# Patient Record
Sex: MALE | Race: WHITE | Employment: OTHER | URBAN - METROPOLITAN AREA
[De-identification: names, ages, dates, MRNs, and addresses within clinical notes are randomized per-mention and may not be internally consistent; named-entity substitution may affect disease eponyms.]

---

## 2018-11-26 ENCOUNTER — HOSPITAL ENCOUNTER (OUTPATIENT)
Dept: PREADMISSION TESTING | Age: 78
Discharge: HOME | End: 2018-11-26
Attending: PLASTIC SURGERY
Payer: MEDICARE

## 2018-11-26 ENCOUNTER — HOSPITAL ENCOUNTER (EMERGENCY)
Age: 78
Discharge: STILL ADMITTED OR ADMINISTRATIVE | End: 2018-11-26
Attending: EMERGENCY MEDICINE
Payer: MEDICARE

## 2018-11-26 VITALS
RESPIRATION RATE: 18 BRPM | TEMPERATURE: 97.6 F | HEART RATE: 75 BPM | OXYGEN SATURATION: 95 % | WEIGHT: 227.07 LBS | DIASTOLIC BLOOD PRESSURE: 67 MMHG | HEIGHT: 72 IN | BODY MASS INDEX: 30.76 KG/M2 | SYSTOLIC BLOOD PRESSURE: 143 MMHG

## 2018-11-26 LAB
ANION GAP SERPL CALC-SCNC: 6 MMOL/L (ref 5–15)
ATRIAL RATE: 73 BPM
BUN SERPL-MCNC: 17 MG/DL (ref 6–20)
BUN/CREAT SERPL: 16 (ref 12–20)
CALCIUM SERPL-MCNC: 8.4 MG/DL (ref 8.5–10.1)
CALCULATED R AXIS, ECG10: 19 DEGREES
CALCULATED T AXIS, ECG11: 38 DEGREES
CHLORIDE SERPL-SCNC: 109 MMOL/L (ref 97–108)
CO2 SERPL-SCNC: 27 MMOL/L (ref 21–32)
CREAT SERPL-MCNC: 1.09 MG/DL (ref 0.7–1.3)
DIAGNOSIS, 93000: NORMAL
ERYTHROCYTE [DISTWIDTH] IN BLOOD BY AUTOMATED COUNT: 13.7 % (ref 11.5–14.5)
GLUCOSE SERPL-MCNC: 99 MG/DL (ref 65–100)
HCT VFR BLD AUTO: 40.2 % (ref 36.6–50.3)
HGB BLD-MCNC: 13.3 G/DL (ref 12.1–17)
MCH RBC QN AUTO: 32 PG (ref 26–34)
MCHC RBC AUTO-ENTMCNC: 33.1 G/DL (ref 30–36.5)
MCV RBC AUTO: 96.9 FL (ref 80–99)
NRBC # BLD: 0 K/UL (ref 0–0.01)
NRBC BLD-RTO: 0 PER 100 WBC
P-R INTERVAL, ECG05: 198 MS
PLATELET # BLD AUTO: 108 K/UL (ref 150–400)
PMV BLD AUTO: 11.6 FL (ref 8.9–12.9)
POTASSIUM SERPL-SCNC: 4.2 MMOL/L (ref 3.5–5.1)
Q-T INTERVAL, ECG07: 392 MS
QRS DURATION, ECG06: 92 MS
QTC CALCULATION (BEZET), ECG08: 431 MS
RBC # BLD AUTO: 4.15 M/UL (ref 4.1–5.7)
SODIUM SERPL-SCNC: 142 MMOL/L (ref 136–145)
VENTRICULAR RATE, ECG03: 73 BPM
WBC # BLD AUTO: 3.9 K/UL (ref 4.1–11.1)

## 2018-11-26 PROCEDURE — 75810000275 HC EMERGENCY DEPT VISIT NO LEVEL OF CARE

## 2018-11-26 PROCEDURE — 36415 COLL VENOUS BLD VENIPUNCTURE: CPT

## 2018-11-26 PROCEDURE — 80048 BASIC METABOLIC PNL TOTAL CA: CPT

## 2018-11-26 PROCEDURE — 93005 ELECTROCARDIOGRAM TRACING: CPT

## 2018-11-26 PROCEDURE — 85027 COMPLETE CBC AUTOMATED: CPT

## 2018-11-26 RX ORDER — SODIUM CHLORIDE, SODIUM LACTATE, POTASSIUM CHLORIDE, CALCIUM CHLORIDE 600; 310; 30; 20 MG/100ML; MG/100ML; MG/100ML; MG/100ML
25 INJECTION, SOLUTION INTRAVENOUS CONTINUOUS
Status: CANCELLED | OUTPATIENT
Start: 2018-11-30

## 2018-11-26 NOTE — PERIOP NOTES
[facility] Preoperative Instructions Surgery Date 11/30/2018          Time of Arrival 5:45 a.m. 
 
1. On the day of your surgery, please report to the Surgical Services Registration Desk and sign in at your designated time. The Surgery Center is located to the right of the Emergency Room. 2. You must have someone with you to drive you home. You should not drive a car for 24 hours following surgery. Please make arrangements for a friend or family member to stay with you for the first 24 hours after your surgery. 3. Do not have anything to eat or drink (including water, gum, mints, coffee, juice) after midnight. ?This may not apply to medications prescribed by your physician. ?(Please note below the special instructions with medications to take the morning of your procedure.) 4. We recommend you do not drink any alcoholic beverages for 24 hours before and after your surgery. 5. Contact your surgeons office for instructions on the following medications: non-steroidal anti-inflammatory drugs (i.e. Advil, Aleve), vitamins, and supplements. (Some surgeons will want you to stop these medications prior to surgery and others may allow you to take them) **If you are currently taking Plavix, Coumadin, Aspirin and/or other blood-thinning agents, contact your surgeon for instructions. ** Your surgeon will partner with the physician prescribing these medications to determine if it is safe to stop or if you need to continue taking. Please do not stop taking these medications without instructions from your surgeon 6. Wear comfortable clothes. Wear glasses instead of contacts. Do not bring any money or jewelry. Please bring picture ID, insurance card, and any prearranged co-payment or hospital payment. Do not wear make-up, particularly mascara the morning of your surgery. Do not wear nail polish, particularly if you are having foot /hand surgery.   Wear your hair loose or down, no ponytails, buns, yariel pins or clips. All body piercings must be removed. Please shower with antibacterial soap for three consecutive days before and on the morning of surgery, but do not apply any lotions, powders or deodorants after the shower on the day of surgery. Please use a fresh towels after each shower. Please sleep in clean clothes and change bed linens the night before surgery. Please do not shave for 48 hours prior to surgery. Shaving of the face is acceptable. 7. You should understand that if you do not follow these instructions your surgery may be cancelled. If your physical condition changes (I.e. fever, cold or flu) please contact your surgeon as soon as possible. 8. It is important that you be on time. If a situation occurs where you may be late, please call [phone] (OR Holding Area). 9. If you have any questions and or problems, please call [phone] (Pre-admission Testing). 10. Your surgery time may be subject to change. You will receive a phone call the evening prior if your time changes. 11.  If having outpatient surgery, you must have someone to drive you here, stay with you during the duration of your stay, and to drive you home at time of discharge. 12.   In an effort to improve the efficiency, privacy, and safety for all of our Pre-op patients visitors are not allowed in the Holding area. Once you arrive and are registered your family/visitors will be asked to remain in the waiting room. The Pre-op staff will get you from the Surgical Waiting Area and will explain to you and your family/visitors that the Pre-op phase is beginning. The staff will answer any questions and provide instructions for tracking of the patient, by use of the existing tracking number and color-coded status board in the waiting room.   At this time the staff will also ask for your designated spokesperson information in the event that the physician or staff need to provide an update or obtain any pertinent information. The designated spokesperson will be notified if the physician needs to speak to family during the pre-operative phase. If at any time your family/visitors has questions or concerns they may approach the volunteer desk in the waiting area for assistance. Special Instructions: MEDICATIONS TO TAKE THE MORNING OF SURGERY WITH A SIP OF WATER: None I understand a pre-operative phone call will be made to verify my surgery time. In the event that I am not available, I give permission for a message to be left on my answering service and/or with another person? Yes [phone] 
 
 
 
 ___________________      __________   _________ 
  (Signature of Patient)             (Witness)                (Date and Time)

## 2018-11-26 NOTE — PERIOP NOTES
Preop CBC and BMP faxed to Dr. Naldo Quiles and Dr. Socorro Hale, noting CBC results. Both faxes confirmed.

## 2018-11-30 ENCOUNTER — ANESTHESIA EVENT (OUTPATIENT)
Dept: SURGERY | Age: 78
End: 2018-11-30
Payer: MEDICARE

## 2018-11-30 ENCOUNTER — HOSPITAL ENCOUNTER (OUTPATIENT)
Age: 78
Setting detail: OUTPATIENT SURGERY
Discharge: HOME | End: 2018-11-30
Attending: PLASTIC SURGERY | Admitting: PLASTIC SURGERY
Payer: MEDICARE

## 2018-11-30 ENCOUNTER — ANESTHESIA (OUTPATIENT)
Dept: SURGERY | Age: 78
End: 2018-11-30
Payer: MEDICARE

## 2018-11-30 VITALS
HEART RATE: 60 BPM | WEIGHT: 218.92 LBS | BODY MASS INDEX: 29.65 KG/M2 | HEIGHT: 72 IN | OXYGEN SATURATION: 96 % | RESPIRATION RATE: 16 BRPM | TEMPERATURE: 97.6 F | SYSTOLIC BLOOD PRESSURE: 144 MMHG | DIASTOLIC BLOOD PRESSURE: 64 MMHG

## 2018-11-30 LAB
ERYTHROCYTE [DISTWIDTH] IN BLOOD BY AUTOMATED COUNT: 13.4 % (ref 11.5–14.5)
HCT VFR BLD AUTO: 42.4 % (ref 36.6–50.3)
HGB BLD-MCNC: 14.4 G/DL (ref 12.1–17)
MCH RBC QN AUTO: 31.9 PG (ref 26–34)
MCHC RBC AUTO-ENTMCNC: 34 G/DL (ref 30–36.5)
MCV RBC AUTO: 94 FL (ref 80–99)
NRBC # BLD: 0 K/UL (ref 0–0.01)
NRBC BLD-RTO: 0 PER 100 WBC
PLATELET # BLD AUTO: 117 K/UL (ref 150–400)
PMV BLD AUTO: 12.1 FL (ref 8.9–12.9)
RBC # BLD AUTO: 4.51 M/UL (ref 4.1–5.7)
WBC # BLD AUTO: 4.7 K/UL (ref 4.1–11.1)

## 2018-11-30 PROCEDURE — 74011000250 HC RX REV CODE- 250: Performed by: PLASTIC SURGERY

## 2018-11-30 PROCEDURE — 36415 COLL VENOUS BLD VENIPUNCTURE: CPT

## 2018-11-30 PROCEDURE — 76210000006 HC OR PH I REC 0.5 TO 1 HR: Performed by: PLASTIC SURGERY

## 2018-11-30 PROCEDURE — 77030011640 HC PAD GRND REM COVD -A: Performed by: PLASTIC SURGERY

## 2018-11-30 PROCEDURE — 74011250636 HC RX REV CODE- 250/636

## 2018-11-30 PROCEDURE — 76010000149 HC OR TIME 1 TO 1.5 HR: Performed by: PLASTIC SURGERY

## 2018-11-30 PROCEDURE — 76060000033 HC ANESTHESIA 1 TO 1.5 HR: Performed by: PLASTIC SURGERY

## 2018-11-30 PROCEDURE — 76210000021 HC REC RM PH II 0.5 TO 1 HR: Performed by: PLASTIC SURGERY

## 2018-11-30 PROCEDURE — 77030002933 HC SUT MCRYL J&J -A: Performed by: PLASTIC SURGERY

## 2018-11-30 PROCEDURE — 77030020782 HC GWN BAIR PAWS FLX 3M -B

## 2018-11-30 PROCEDURE — 74011000272 HC RX REV CODE- 272: Performed by: PLASTIC SURGERY

## 2018-11-30 PROCEDURE — 88331 PATH CONSLTJ SURG 1 BLK 1SPC: CPT

## 2018-11-30 PROCEDURE — 77030031139 HC SUT VCRL2 J&J -A: Performed by: PLASTIC SURGERY

## 2018-11-30 PROCEDURE — 88304 TISSUE EXAM BY PATHOLOGIST: CPT

## 2018-11-30 PROCEDURE — 77030032490 HC SLV COMPR SCD KNE COVD -B: Performed by: PLASTIC SURGERY

## 2018-11-30 PROCEDURE — 85027 COMPLETE CBC AUTOMATED: CPT

## 2018-11-30 PROCEDURE — 88305 TISSUE EXAM BY PATHOLOGIST: CPT

## 2018-11-30 PROCEDURE — 74011250636 HC RX REV CODE- 250/636: Performed by: ANESTHESIOLOGY

## 2018-11-30 PROCEDURE — 77030018836 HC SOL IRR NACL ICUM -A: Performed by: PLASTIC SURGERY

## 2018-11-30 RX ORDER — MORPHINE SULFATE 10 MG/ML
2 INJECTION, SOLUTION INTRAMUSCULAR; INTRAVENOUS
Status: DISCONTINUED | OUTPATIENT
Start: 2018-11-30 | End: 2018-11-30 | Stop reason: HOSPADM

## 2018-11-30 RX ORDER — FENTANYL CITRATE 50 UG/ML
25 INJECTION, SOLUTION INTRAMUSCULAR; INTRAVENOUS
Status: DISCONTINUED | OUTPATIENT
Start: 2018-11-30 | End: 2018-11-30 | Stop reason: HOSPADM

## 2018-11-30 RX ORDER — MIDAZOLAM HYDROCHLORIDE 1 MG/ML
0.5 INJECTION, SOLUTION INTRAMUSCULAR; INTRAVENOUS
Status: DISCONTINUED | OUTPATIENT
Start: 2018-11-30 | End: 2018-11-30 | Stop reason: HOSPADM

## 2018-11-30 RX ORDER — SODIUM CHLORIDE, SODIUM LACTATE, POTASSIUM CHLORIDE, CALCIUM CHLORIDE 600; 310; 30; 20 MG/100ML; MG/100ML; MG/100ML; MG/100ML
125 INJECTION, SOLUTION INTRAVENOUS CONTINUOUS
Status: DISCONTINUED | OUTPATIENT
Start: 2018-11-30 | End: 2018-11-30 | Stop reason: HOSPADM

## 2018-11-30 RX ORDER — SODIUM CHLORIDE, SODIUM LACTATE, POTASSIUM CHLORIDE, CALCIUM CHLORIDE 600; 310; 30; 20 MG/100ML; MG/100ML; MG/100ML; MG/100ML
25 INJECTION, SOLUTION INTRAVENOUS CONTINUOUS
Status: DISCONTINUED | OUTPATIENT
Start: 2018-11-30 | End: 2018-11-30 | Stop reason: HOSPADM

## 2018-11-30 RX ORDER — ONDANSETRON 2 MG/ML
4 INJECTION INTRAMUSCULAR; INTRAVENOUS AS NEEDED
Status: DISCONTINUED | OUTPATIENT
Start: 2018-11-30 | End: 2018-11-30 | Stop reason: HOSPADM

## 2018-11-30 RX ORDER — MIDAZOLAM HYDROCHLORIDE 1 MG/ML
1 INJECTION, SOLUTION INTRAMUSCULAR; INTRAVENOUS AS NEEDED
Status: DISCONTINUED | OUTPATIENT
Start: 2018-11-30 | End: 2018-11-30 | Stop reason: HOSPADM

## 2018-11-30 RX ORDER — LIDOCAINE HYDROCHLORIDE 10 MG/ML
0.1 INJECTION, SOLUTION EPIDURAL; INFILTRATION; INTRACAUDAL; PERINEURAL AS NEEDED
Status: DISCONTINUED | OUTPATIENT
Start: 2018-11-30 | End: 2018-11-30 | Stop reason: HOSPADM

## 2018-11-30 RX ORDER — DIPHENHYDRAMINE HYDROCHLORIDE 50 MG/ML
12.5 INJECTION, SOLUTION INTRAMUSCULAR; INTRAVENOUS AS NEEDED
Status: DISCONTINUED | OUTPATIENT
Start: 2018-11-30 | End: 2018-11-30 | Stop reason: HOSPADM

## 2018-11-30 RX ORDER — HYDROMORPHONE HYDROCHLORIDE 1 MG/ML
0.2 INJECTION, SOLUTION INTRAMUSCULAR; INTRAVENOUS; SUBCUTANEOUS
Status: DISCONTINUED | OUTPATIENT
Start: 2018-11-30 | End: 2018-11-30 | Stop reason: HOSPADM

## 2018-11-30 RX ORDER — OXYCODONE HYDROCHLORIDE 5 MG/1
5 TABLET ORAL AS NEEDED
Status: DISCONTINUED | OUTPATIENT
Start: 2018-11-30 | End: 2018-11-30 | Stop reason: HOSPADM

## 2018-11-30 RX ORDER — IBUPROFEN 600 MG/1
600 TABLET ORAL
Qty: 20 TAB | Refills: 1 | Status: SHIPPED | OUTPATIENT
Start: 2018-11-30

## 2018-11-30 RX ORDER — SODIUM CHLORIDE 0.9 % (FLUSH) 0.9 %
5-10 SYRINGE (ML) INJECTION AS NEEDED
Status: DISCONTINUED | OUTPATIENT
Start: 2018-11-30 | End: 2018-11-30 | Stop reason: HOSPADM

## 2018-11-30 RX ORDER — PROPOFOL 10 MG/ML
INJECTION, EMULSION INTRAVENOUS AS NEEDED
Status: DISCONTINUED | OUTPATIENT
Start: 2018-11-30 | End: 2018-11-30 | Stop reason: HOSPADM

## 2018-11-30 RX ORDER — BACITRACIN ZINC 500 UNIT/G
OINTMENT (GRAM) TOPICAL AS NEEDED
Status: DISCONTINUED | OUTPATIENT
Start: 2018-11-30 | End: 2018-11-30 | Stop reason: HOSPADM

## 2018-11-30 RX ORDER — FENTANYL CITRATE 50 UG/ML
50 INJECTION, SOLUTION INTRAMUSCULAR; INTRAVENOUS AS NEEDED
Status: DISCONTINUED | OUTPATIENT
Start: 2018-11-30 | End: 2018-11-30 | Stop reason: HOSPADM

## 2018-11-30 RX ORDER — PHENYLEPHRINE HCL IN 0.9% NACL 0.4MG/10ML
SYRINGE (ML) INTRAVENOUS AS NEEDED
Status: DISCONTINUED | OUTPATIENT
Start: 2018-11-30 | End: 2018-11-30 | Stop reason: HOSPADM

## 2018-11-30 RX ORDER — PROPOFOL 10 MG/ML
INJECTION, EMULSION INTRAVENOUS
Status: DISCONTINUED | OUTPATIENT
Start: 2018-11-30 | End: 2018-11-30 | Stop reason: HOSPADM

## 2018-11-30 RX ORDER — FENTANYL CITRATE 50 UG/ML
INJECTION, SOLUTION INTRAMUSCULAR; INTRAVENOUS AS NEEDED
Status: DISCONTINUED | OUTPATIENT
Start: 2018-11-30 | End: 2018-11-30 | Stop reason: HOSPADM

## 2018-11-30 RX ORDER — SODIUM CHLORIDE 0.9 % (FLUSH) 0.9 %
5-10 SYRINGE (ML) INJECTION EVERY 8 HOURS
Status: DISCONTINUED | OUTPATIENT
Start: 2018-11-30 | End: 2018-11-30 | Stop reason: HOSPADM

## 2018-11-30 RX ADMIN — SODIUM CHLORIDE, SODIUM LACTATE, POTASSIUM CHLORIDE, AND CALCIUM CHLORIDE 25 ML/HR: 600; 310; 30; 20 INJECTION, SOLUTION INTRAVENOUS at 07:08

## 2018-11-30 RX ADMIN — FENTANYL CITRATE 25 MCG: 50 INJECTION, SOLUTION INTRAMUSCULAR; INTRAVENOUS at 07:51

## 2018-11-30 RX ADMIN — Medication 120 MCG: at 08:26

## 2018-11-30 RX ADMIN — Medication 40 MCG: at 08:16

## 2018-11-30 RX ADMIN — FENTANYL CITRATE 25 MCG: 50 INJECTION, SOLUTION INTRAMUSCULAR; INTRAVENOUS at 07:58

## 2018-11-30 RX ADMIN — FENTANYL CITRATE 50 MCG: 50 INJECTION, SOLUTION INTRAMUSCULAR; INTRAVENOUS at 07:44

## 2018-11-30 RX ADMIN — PROPOFOL 10 MG: 10 INJECTION, EMULSION INTRAVENOUS at 07:58

## 2018-11-30 RX ADMIN — Medication 40 MCG: at 08:18

## 2018-11-30 RX ADMIN — PROPOFOL 50 MCG/KG/MIN: 10 INJECTION, EMULSION INTRAVENOUS at 07:45

## 2018-11-30 RX ADMIN — PROPOFOL 25 MG: 10 INJECTION, EMULSION INTRAVENOUS at 07:45

## 2018-11-30 NOTE — BRIEF OP NOTE
BRIEF OPERATIVE NOTE Date of Procedure: 11/30/2018 Preoperative Diagnosis: LEFT TEMPLE AND BACK LESIONS Postoperative Diagnosis: LEFT TEMPLE AND BACK LESIONS Procedure(s): EXCISION LEFT TEMPLE LESION 2 x 3cm WITH LOCAL FLAP 3 x 4cm, EXCISION BACK CYST 2.5 x 3.5cm with complex closure 4cm Surgeon(s) and Role: Elaine Wan MD - Primary Surgical Assistant: Clarice Mae 
 
Surgical Staff: 
Circ-1: Lu Long RN Scrub Tech-1: Roni Diaz Surg Asst-1: Krystle Wiseman Float Staff: Margarita Schaefer RN Event Time In Time Out Incision Start 8028 Incision Close 3613 Anesthesia: MAC Estimated Blood Loss: 5ml Specimens:  
ID Type Source Tests Collected by Time Destination 1 : LEFT TEMPLE BASAL CELL [facility] SUPERIOR Frozen Section Face  Nicholas Gordon MD 11/30/2018 1375 Pathology 2 : BACK CYST Preservative Back  Nicholas Gordon MD 11/30/2018 0321 Pathology Findings: see note Complications: none Implants: * No implants in log *

## 2018-11-30 NOTE — ANESTHESIA PREPROCEDURE EVALUATION
Anesthetic History No history of anesthetic complications Review of Systems / Medical History Patient summary reviewed, nursing notes reviewed and pertinent labs reviewed Pulmonary Neuro/Psych Within defined limits Cardiovascular Hypertension: well controlled Exercise tolerance: >4 METS 
  
GI/Hepatic/Renal 
  
 
 
 
 
 
Comments: History of polyps Endo/Other Other Findings Physical Exam 
 
Airway Mallampati: II 
TM Distance: > 6 cm Neck ROM: normal range of motion Mouth opening: Normal 
 
 Cardiovascular Regular rate and rhythm,  S1 and S2 normal,  no murmur, click, rub, or gallop Rhythm: regular Rate: normal 
 
 
 
 Dental 
No notable dental hx Pulmonary Breath sounds clear to auscultation Abdominal 
GI exam deferred Other Findings Anesthetic Plan ASA: 2 Anesthesia type: general 
 
 
 
 
Induction: Intravenous Anesthetic plan and risks discussed with: Patient

## 2018-11-30 NOTE — DISCHARGE INSTRUCTIONS
May shower tomorrow and get incisions wet. Apply ointment to face incision twice daily for 1 week. FOLLOW UP VISIT Appointment in: Two Weeks Call [phone] to make appt. DISCHARGE SUMMARY from Nurse    PATIENT INSTRUCTIONS:    After general anesthesia or intravenous sedation, for 24 hours or while taking prescription Narcotics:  · Limit your activities  · Do not drive and operate hazardous machinery  · Do not make important personal or business decisions  · Do  not drink alcoholic beverages  · If you have not urinated within 8 hours after discharge, please contact your surgeon on call. Report the following to your surgeon:  · Excessive pain, swelling, redness or odor of or around the surgical area  · Temperature over 100.5  · Nausea and vomiting lasting longer than 4 hours or if unable to take medications  · Any signs of decreased circulation or nerve impairment to extremity: change in color, persistent  numbness, tingling, coldness or increase pain  · Any questions    What to do at Home:    *  Please give a list of your current medications to your Primary Care Provider. *  Please update this list whenever your medications are discontinued, doses are      changed, or new medications (including over-the-counter products) are added. *  Please carry medication information at all times in case of emergency situations. These are general instructions for a healthy lifestyle:    No smoking/ No tobacco products/ Avoid exposure to second hand smoke  Surgeon General's Warning:  Quitting smoking now greatly reduces serious risk to your health.     Obesity, smoking, and sedentary lifestyle greatly increases your risk for illness    A healthy diet, regular physical exercise & weight monitoring are important for maintaining a healthy lifestyle    You may be retaining fluid if you have a history of heart failure or if you experience any of the following symptoms:  Weight gain of 3 pounds or more overnight or 5 pounds in a week, increased swelling in our hands or feet or shortness of breath while lying flat in bed. Please call your doctor as soon as you notice any of these symptoms; do not wait until your next office visit. Recognize signs and symptoms of STROKE:    F-face looks uneven    A-arms unable to move or move unevenly    S-speech slurred or non-existent    T-time-call 911 as soon as signs and symptoms begin-DO NOT go       Back to bed or wait to see if you get better-TIME IS BRAIN. Warning Signs of HEART ATTACK     Call 911 if you have these symptoms:   Chest discomfort. Most heart attacks involve discomfort in the center of the chest that lasts more than a few minutes, or that goes away and comes back. It can feel like uncomfortable pressure, squeezing, fullness, or pain.  Discomfort in other areas of the upper body. Symptoms can include pain or discomfort in one or both arms, the back, neck, jaw, or stomach.  Shortness of breath with or without chest discomfort.  Other signs may include breaking out in a cold sweat, nausea, or lightheadedness. Don't wait more than five minutes to call 911 - MINUTES MATTER! Fast action can save your life. Calling 911 is almost always the fastest way to get lifesaving treatment. Emergency Medical Services staff can begin treatment when they arrive -- up to an hour sooner than if someone gets to the hospital by car. The discharge information has been reviewed with the patient and caregiver. The patient and caregiver verbalized understanding. Discharge medications reviewed with the patient and caregiver and appropriate educational materials and side effects teaching were provided. A common side effect of anesthesia following surgery is nausea and/or vomiting. In order to decrease symptoms, it is wise to avoid foods that are high in fat, greasy foods, milk products, and spicy foods for the first 24 hours.     Acceptable foods for the first 24 hours following surgery include but are not limited to:     soup   broth    toast    crackers    applesauce    bananas    mashed potatoes,   soft or scrambled eggs   oatmeal    jello    It is important to eat when taking your pain medication. This will help to prevent nausea. If possible, please try to time your meals with your medications. It is very important to stay hydrated following surgery. Sip fluids frequently while awake. Avoid acidic drinks such as citrus juices and soda for 24 hours. Carbonated beverages may cause bloating and gas. Acceptable fluids include:    - water (flavor packets may add variety)  - coffee or tea (in moderation)  - Gatorade  - Juan-aid  - apple juice  - cranberry juice    You are encouraged to cough and deep breathe every hour when awake. This will help to prevent respiratory complications following anesthesia. You may want to hug a pillow when coughing and sneezing to add additional support to the surgical area and to decrease discomfort if you had abdominal or chest surgery. If you are discharged home with support stockings, you may remove them after 24 hours. Support stockings are used to help prevent blood clots in the legs following surgery. Please take time to review all of your Home Care Instructions and Medication Information sheets provided in your discharge packet. If you have any questions, please contact your surgeons office. Thank you. How to Care for Your Wound After Its Treated With  DERMABOND* Topical Skin Adhesive  DERMABOND* Topical Skin Adhesive (2-octyl cyanoacrylate) is a sterile, liquid skin adhesive  that holds wound edges together. The film will usually remain in place for 5 to 10 days, then  naturally fall off your skin.   The following will answer some of your questions and provide instructions for proper care for your  wound while it is healing:    CHECK WOUND APPEARANCE   Some swelling, redness, and pain are common with all wounds and normally will go away as the  wound heals. If swelling, redness, or pain increases or if the wound feels warm to the touch,  contact a doctor. Also contact a doctor if the wound edges reopen or separate. REPLACE BANDAGES   If your wound is bandaged, keep the bandage dry.  Replace the dressing daily until the adhesive film has fallen off or if the  bandage should become wet, unless otherwise instructed by your  physician.  When changing the dressing, do not place tape directly over the  DERMABOND adhesive film, because removing the tape later may also  remove the film. AVOID TOPICAL MEDICATIONS   Do not apply liquid or ointment medications or any other product to your wound while the  DERMABOND adhesive film is in place. These may loosen the film before your wound is healed. KEEP WOUND DRY AND PROTECTED   You may occasionally and briefly wet your wound in the shower or bath. Do not soak or scrub  your wound, do not swim, and avoid periods of heavy perspiration until the DERMABOND  adhesive has naturally fallen off. After showering or bathing, gently blot your wound dry with a  soft towel. If a protective dressing is being used, apply a fresh, dry bandage, being sure to keep  the tape off the DERMABOND adhesive film.  Apply a clean, dry bandage over the wound if necessary to protect it.  Protect your wound from injury until the skin has had sufficient time to heal.   Do not scratch, rub, or pick at the DERMABOND adhesive film. This may loosen the film before  your wound is healed.  Protect the wound from prolonged exposure to sunlight or tanning lamps while the film is in  place. If you have any questions or concerns about this product, please consult your doctor. *Trademark ©[employer] 2002    Ibuprofen (By mouth)   Ibuprofen (eye-bue-PROE-fen)  Treats pain and fever. This medicine is an NSAID. Brand Name(s):  Advil, Advil Children's, Advil Liqui-Gels, Advil Migraine, All-Purpose First Aid Kit, Children's Ibuprofen, Children's Motrin, Comfort Pac, Concentrated Motrin Infants' Drops, Equate Ibuprofen Philip Strength, Genpril, Good Neighbor Ibuprofen Infants', [facility] Children's Ibuprofen, [facility] Ibuprofen, [facility] Ibuprofen Philip Strength   There may be other brand names for this medicine. When This Medicine Should Not Be Used: This medicine is not right for everyone. Do not use if you had an allergic reaction (including asthma) to ibuprofen, aspirin, or another NSAID, or right before or after heart surgery. How to Use This Medicine:   Capsule, Liquid Filled Capsule, Suspension, Tablet, Chewable Tablet  · Your doctor will tell you how much medicine to use. Do not use more than directed. · Prescription ibuprofen should come with a Medication Guide. Ask your pharmacist for the Medication Guide if you do not have one. · Follow the instructions on the medicine label if you are using this medicine without a prescription. · Take this medicine with food or milk if it upsets your stomach. · Oral liquid: Shake well just before using. Measure with a marked measuring spoon, oral syringe, or medicine cup. · Chewable tablet: Chew completely before you swallow it. Then drink some water to make sure you swallow all of the medicine. · For Children: Ask your pharmacist if you are not sure how much medicine to give a child. The dose is usually based on weight, not age. Never give more medicine than directed. · For Adults: Do not take more than 6 pills in 1 day (24 hours) unless your doctor tells you to. · Missed dose: If you take this medicine on a regular basis and miss a dose, take it as soon as you can. If it is almost time for your next dose, wait until then to use the medicine and skip the missed dose. Do not use extra medicine to make up for a missed dose.   · Store the medicine in a closed container at room temperature, away from heat, moisture, and direct light. Do not freeze the oral liquid. Drugs and Foods to Avoid:   Ask your doctor or pharmacist before using any other medicine, including over-the-counter medicines, vitamins, and herbal products. · Some foods and medicine can affect how ibuprofen works. Tell your doctor if you are also using lithium, methotrexate, a blood thinner (such as warfarin), a steroid medicine (such as hydrocortisone, prednisolone, prednisone), a diuretic (water pill), or an ACE inhibitor blood pressure medicine. · Do not use any other NSAID medicine unless your doctor says it is okay. Some other NSAIDs are aspirin, diclofenac, naproxen, or celecoxib. · Do not drink alcohol while you are using this medicine. Warnings While Using This Medicine:   · Tell your doctor if you are pregnant or breastfeeding. Do not use this medicine during the later part of pregnancy. · Tell your doctor if you have kidney disease, liver disease, asthma, lupus or a similar connective tissue disease, or a history of ulcers or other digestion problems. Tell your doctor if you smoke or have heart or blood circulation problems, including high blood pressure, heart failure (CHF), or bleeding problems. · This medicine may cause the following problems:  ¨ Bleeding and ulcers in the stomach or intestines  ¨ Higher risk of heart attack or stroke  ¨ Liver damage  ¨ Kidney damage  ¨ Vision problems  · Call your doctor if symptoms get worse, pain lasts more than 10 days, or fever lasts more than 3 days. · This medicine might contain sugar or phenylalanine (aspartame). · Tell any doctor or dentist who treats you that you are using this medicine. · Keep all medicine out of the reach of children. Never share your medicine with anyone.   Possible Side Effects While Using This Medicine:   Call your doctor right away if you notice any of these side effects:  · Allergic reaction: Itching or hives, swelling in your face or hands, swelling or tingling in your mouth or throat, chest tightness, trouble breathing  · Blistering, peeling, or red skin rash  · Change in how much or how often you urinate  · Chest pain that may spread to your arms, jaw, back, or neck, trouble breathing, nausea, unusual sweating, faintness  · Chest pain, trouble breathing, weakness on one side of your body, severe headache, trouble seeing or talking, pain in your lower leg  · Dark urine or pale stools, nausea, vomiting, loss of appetite, stomach pain, yellow skin or eyes  · Fever, neck pain, stiff neck  · Severe stomach pain, vomiting blood, bloody or black, tarry stools  · Swelling in your hands, ankles, or feet, rapid weight gain  · Trouble seeing, blind spots, change in how you see colors  · Unusual bleeding, bruising, or weakness  If you notice these less serious side effects, talk with your doctor:   · Constipation, diarrhea, gas, mild upset stomach  · Dizziness, headache, ringing in the ears  If you notice other side effects that you think are caused by this medicine, tell your doctor. Call your doctor for medical advice about side effects. You may report side effects to FDA at 5-372-FDA-2981  © 2017 [address] Information is for End User's use only and may not be sold, redistributed or otherwise used for commercial purposes. The above information is an  only. It is not intended as medical advice for individual conditions or treatments.  Talk to your doctor, nurse or pharmacist before following any medical regimen to see if it is safe and effective for you.  ___________________________________________________________________________________________________________________________________

## 2018-11-30 NOTE — OP NOTES
[address] REPORT    Lambert Mcmahon  MR#: [id number]  : 1940  ACCOUNT #: [de-identified]   DATE OF SERVICE: 2018    PREOPERATIVE DIAGNOSES:  1.  Basal cell carcinoma, left temple. 2.  Symptomatic inclusion cyst of lower back. POSTOPERATIVE DIAGNOSES:    1.  Basal cell carcinoma, left temple. 2.  Symptomatic inclusion cyst of lower back. PROCEDURES PERFORMED:  1. Excision left temple basal cell carcinoma measuring 2 x 3 cm with rhomboid flap reconstruction measuring 3 x 4 cm. 2.  Excision lower back symptomatic inclusion cyst measuring 2.5 x 3.5 cm. 3.  Complex closure of lower back wound measuring 4 cm in length. SURGEON:  Saba Alas MD     ASSISTANT:   JOVON gaitan    ANESTHESIA:  Local with IV sedation. ESTIMATED BLOOD LOSS:  Less than 5 mL. SPECIMENS REMOVED:    1. Left temple basal cell carcinoma. 2.  Lower back cyst.    COMPLICATIONS:  None. IMPLANTS:  none    INDICATIONS:  This 78-year-old white male presented with the skin lesions described above, requiring excision and reconstruction. PROCEDURE:  After informed consent was obtained,  under IV sedation, the patient was rolled onto his right side with all pressure points appropriately padded. The operative sites were infiltrated with buffered 1% lidocaine with epinephrine and prepped and draped. Attention was turned to the left temple, where under loupe magnification, the visible lesion was excised down to deep subcutaneous tissue leaving 3-4 mm margins peripherally. It was marked with a single stitch superiorly and sent for frozen section, which revealed basal cell carcinoma with clear margins. Attention was turned to the reconstruction. The wound could not be closed primarily. Inferomedially based rhomboid flap was designed and incised. The flap was sharply elevated in a subcutaneous plane, as well were the margins of the defect.   Hemostasis was obtained, and the flap was transposed and secured with 5-0 Vicryl and 5-0 Prolene. The donor site was closed primarily in similar fashion. Attention was then turned to the lower back, where the cyst was excised as a transverse ellipse. It was dissected free from adherent tissue and sent for permanent pathologic evaluation. Significant undermining was performed. Hemostasis was obtained, and complex closure was performed in layers with 3-0 Monocryl. He tolerated the procedure well. Dermabond and dry dressings were applied. He was transferred to recovery room in good condition.       MD DALILA Corral / JOSE  D: 11/30/2018 09:04     T: 11/30/2018 11:21  JOB #: [id number]  CC: Nikolay Kerr MD  CC: Fidelina De Leon MD  CC: Tereza Jacobs MD

## 2018-11-30 NOTE — ANESTHESIA POSTPROCEDURE EVALUATION
Procedure(s): EXCISION LEFT TEMPLE LESION WITH LOCAL FLAP OR SKIN GRAFT, EXCISION BACK CYST. Anesthesia Post Evaluation Patient location during evaluation: PACU Note status: Adequate. Level of consciousness: responsive to verbal stimuli and sleepy but conscious Pain management: satisfactory to patient Airway patency: patent Anesthetic complications: no 
Cardiovascular status: acceptable Respiratory status: acceptable Hydration status: acceptable Comments: +Post-Anesthesia Evaluation and Assessment Patient: Antonio Mathur MRN: [id number]  SSN: [id number] YOB: 1940  Age: [de-identified] y.o. Sex: male Cardiovascular Function/Vital Signs /70   Pulse 62   Temp 36.7 °C (98 °F)   Resp 13   Ht 6' (1.829 m)   Wt 99.3 kg (218 lb 14.7 oz)   SpO2 93%   BMI 29.69 kg/m² Patient is status post Procedure(s): EXCISION LEFT TEMPLE LESION WITH LOCAL FLAP OR SKIN GRAFT, EXCISION BACK CYST. Nausea/Vomiting: Controlled. Postoperative hydration reviewed and adequate. Pain: 
Pain Scale 1: Numeric (0 - 10) (11/30/18 0900) Pain Intensity 1: 0 (11/30/18 0900) Managed. Neurological Status:  
Neuro (WDL): Within Defined Limits (11/30/18 0843) At baseline. Mental Status and Level of Consciousness: Arousable. Pulmonary Status:  
O2 Device: Room air (11/30/18 0843) Adequate oxygenation and airway patent. Complications related to anesthesia: None Post-anesthesia assessment completed. No concerns. Signed By: Norma Talamantes MD  
 11/30/2018 Post anesthesia nausea and vomiting:  controlled Visit Vitals /70 Pulse 62 Temp 36.7 °C (98 °F) Resp 13 Ht 6' (1.829 m) Wt 99.3 kg (218 lb 14.7 oz) SpO2 93% BMI 29.69 kg/m²

## 2018-11-30 NOTE — PERIOP NOTES
Handoff Report from Operating Room to PACU Report received from Sophia Dobbs RN and Dwight Horowitz CRNA regarding Jeb Zambrano. Surgeon(s): 
Gen Lord MD  And Procedure(s) (LRB): 
EXCISION LEFT TEMPLE LESION WITH LOCAL FLAP OR SKIN GRAFT, EXCISION BACK CYST (Left)  confirmed  
with dressings discussed. Anesthesia type, drugs, patient history, complications, estimated blood loss, vital signs, intake and output, and last pain medication, lines and temperature were reviewed.

## 2020-12-05 ENCOUNTER — OFFICE VISIT (OUTPATIENT)
Dept: URGENT CARE | Age: 80
End: 2020-12-05
Payer: MEDICARE

## 2020-12-05 VITALS
OXYGEN SATURATION: 98 % | HEIGHT: 72 IN | WEIGHT: 210 LBS | TEMPERATURE: 98.6 F | RESPIRATION RATE: 18 BRPM | BODY MASS INDEX: 28.44 KG/M2 | HEART RATE: 78 BPM

## 2020-12-05 DIAGNOSIS — Z20.822 COVID-19 RULED OUT: Primary | ICD-10-CM

## 2020-12-05 PROCEDURE — 99213 OFFICE O/P EST LOW 20 MIN: CPT | Performed by: NURSE PRACTITIONER

## 2020-12-05 PROCEDURE — G0463 HOSPITAL OUTPT CLINIC VISIT: HCPCS | Performed by: NURSE PRACTITIONER

## 2020-12-05 PROCEDURE — U0003 INFECTIOUS AGENT DETECTION BY NUCLEIC ACID (DNA OR RNA); SEVERE ACUTE RESPIRATORY SYNDROME CORONAVIRUS 2 (SARS-COV-2) (CORONAVIRUS DISEASE [COVID-19]), AMPLIFIED PROBE TECHNIQUE, MAKING USE OF HIGH THROUGHPUT TECHNOLOGIES AS DESCRIBED BY CMS-2020-01-R: HCPCS | Performed by: NURSE PRACTITIONER

## 2020-12-06 LAB — SARS-COV-2 RNA SPEC QL NAA+PROBE: DETECTED

## 2020-12-08 ENCOUNTER — TELEPHONE (OUTPATIENT)
Dept: URGENT CARE | Age: 80
End: 2020-12-08

## 2021-03-15 ENCOUNTER — APPOINTMENT (OUTPATIENT)
Dept: RADIOLOGY | Age: 81
End: 2021-03-15
Payer: MEDICARE

## 2021-03-15 ENCOUNTER — OFFICE VISIT (OUTPATIENT)
Dept: URGENT CARE | Age: 81
End: 2021-03-15
Payer: MEDICARE

## 2021-03-15 VITALS
RESPIRATION RATE: 16 BRPM | BODY MASS INDEX: 29.97 KG/M2 | HEART RATE: 70 BPM | TEMPERATURE: 98.3 F | OXYGEN SATURATION: 94 % | WEIGHT: 221 LBS

## 2021-03-15 DIAGNOSIS — J18.9 PNEUMONIA OF BOTH LUNGS DUE TO INFECTIOUS ORGANISM, UNSPECIFIED PART OF LUNG: ICD-10-CM

## 2021-03-15 DIAGNOSIS — R04.2 HEMOPTYSIS: ICD-10-CM

## 2021-03-15 DIAGNOSIS — R04.2 HEMOPTYSIS: Primary | ICD-10-CM

## 2021-03-15 PROCEDURE — 99213 OFFICE O/P EST LOW 20 MIN: CPT

## 2021-03-15 PROCEDURE — G0463 HOSPITAL OUTPT CLINIC VISIT: HCPCS

## 2021-03-15 PROCEDURE — 71046 X-RAY EXAM CHEST 2 VIEWS: CPT

## 2021-03-15 RX ORDER — AZITHROMYCIN 250 MG/1
TABLET, FILM COATED ORAL
Qty: 6 TABLET | Refills: 0 | Status: SHIPPED | OUTPATIENT
Start: 2021-03-15 | End: 2021-03-19

## 2021-03-15 NOTE — PATIENT INSTRUCTIONS
Hemoptysis   WHAT YOU NEED TO KNOW:   Hemoptysis is coughing up blood  This occurs when blood vessels in your airway or lungs weaken or break, and begin to bleed  You may bleed in small or large amounts that appear in your sputum (spit)  DISCHARGE INSTRUCTIONS:   Return to the emergency department if:   · You have new or worsening chest pain or shortness of breath  · Your bleeding gets worse or you cough up a large amount of blood  · You cannot stop vomiting  · You are so dizzy that you think you may fall or faint  · You have pain or swelling in your legs  · Your legs and arms feel cold or look pale  Contact your healthcare provider if:   · You have new or increasing shortness of breath  · You have a fever  · You lose weight without trying  · You feel more weak and tired than usual     · You have a cough that does not improve or gets worse  · You have questions or concerns about your condition or care  Medicines: You may need any of the following:  · Antibiotics  may be given to fight or prevent an infection caused by bacteria  Always take your antibiotics exactly as ordered by your healthcare provider  Do not stop taking your medicine unless directed by your healthcare provider  · Antitussives  help control or stop your cough  · Take your medicine as directed  Contact your healthcare provider if you think your medicine is not helping or if you have side effects  Tell him or her if you are allergic to any medicine  Keep a list of the medicines, vitamins, and herbs you take  Include the amounts, and when and why you take them  Bring the list or the pill bottles to follow-up visits  Carry your medicine list with you in case of an emergency  Follow up with your healthcare provider in 2 days or as directed: You may need frequent visits to monitor your condition and prevent further blood loss  Write down your questions so you remember to ask them during your visits    Use caution with medicines:  Certain medicines, such as NSAIDs, increase your risk for bleeding  Herbal supplements also increase your risk  Examples of herbal supplements are garlic, gingko, and ginseng  Ask your healthcare provider before you take any over-the-counter medicines  Do not smoke, and do not go to smoky areas:  Smoke may worsen your hemoptysis  Nicotine and other chemicals in cigarettes and cigars can also cause lung damage  Ask your healthcare provider for information if you currently smoke and need help to quit  E-cigarettes or smokeless tobacco still contain nicotine  Talk to your healthcare provider before you use these products  © Copyright [address] Information is for End User's use only and may not be sold, redistributed or otherwise used for commercial purposes  All illustrations and images included in CareNotes® are the copyrighted property of A D A M , Inc  or [facility] Óscar Edwards   The above information is an  only  It is not intended as medical advice for individual conditions or treatments  Talk to your doctor, nurse or pharmacist before following any medical regimen to see if it is safe and effective for you

## 2021-03-15 NOTE — PROGRESS NOTES
[address]        NAME: Matheus Douglas is a 78 y o  male  : 1940    MRN: [id number]  DATE: March 15, 2021  TIME: 7:06 PM    Assessment and Plan   Hemoptysis [R04 2]  1  Hemoptysis  XR chest pa & lateral    azithromycin (ZITHROMAX) 250 mg tablet   2  Pneumonia of both lungs due to infectious organism, unspecified part of lung           Patient Instructions     Pneumonia of the lungs via x-rays  Follow up with PCP in 3-5 days  Proceed to  ER if symptoms worsen  Radiologist results later showed possible pulmonary hemorrhage or aspiration/ patient was aclled at about 19:00 hrs and advised to go to the ED for CT to r/o aspiration Vs bleeding  He refused, stating he will rather establish with PCP and then get it done through PCP  Chief Complaint     Chief Complaint   Patient presents with    Coughing Up Blood     pt states he woke up this morning and started coughing up bright red blood with mucous// dull headache started this afternoon         History of Present Illness       HPI   Reports he started coughing yesterday  Says she is coughing up a tinge of blood on the sputum on the sputum  Review of Systems   Review of Systems   Constitutional: Negative for fever  HENT: Negative for congestion, sore throat and trouble swallowing  Respiratory: Positive for cough (with blood)  Negative for chest tightness, shortness of breath and wheezing  Cardiovascular: Negative for chest pain and palpitations  Gastrointestinal: Negative for diarrhea, nausea and vomiting  Neurological: Positive for headaches (started this afternoon)  Negative for light-headedness           Current Medications       Current Outpatient Medications:     azithromycin (ZITHROMAX) 250 mg tablet, Take 2 tablets today then 1 tablet daily x 4 days, Disp: 6 tablet, Rfl: 0    Current Allergies     Allergies as of 03/15/2021    (No Known Allergies)            The following portions of the patient's history were reviewed and updated as appropriate: allergies, current medications, past family history, past medical history, past social history, past surgical history and problem list      Past Medical History:   Diagnosis Date    Cancer [facility])     skin cancer       Past Surgical History:   Procedure Laterality Date    CYST REMOVAL         History reviewed  No pertinent family history  Medications have been verified  Objective   Pulse 70   Temp 98 3 °F (36 8 °C)   Resp 16   Wt 100 kg (221 lb)   SpO2 94%   BMI 29 97 kg/m²   No LMP for male patient  Physical Exam     Physical Exam  Constitutional:       Appearance: He is not ill-appearing or diaphoretic  HENT:      Nose: No rhinorrhea  Mouth/Throat:      Mouth: Mucous membranes are moist       Pharynx: Oropharynx is clear  No oropharyngeal exudate or posterior oropharyngeal erythema  Neck:      Musculoskeletal: Normal range of motion  No muscular tenderness  Cardiovascular:      Rate and Rhythm: Regular rhythm  Heart sounds: Normal heart sounds  Pulmonary:      Effort: Pulmonary effort is normal       Breath sounds: Normal breath sounds  No wheezing  Lymphadenopathy:      Cervical: No cervical adenopathy  Neurological:      Mental Status: He is alert

## 2025-07-31 ENCOUNTER — OFFICE VISIT (OUTPATIENT)
Age: 85
End: 2025-07-31
Payer: MEDICARE

## 2025-07-31 VITALS
WEIGHT: 196.8 LBS | OXYGEN SATURATION: 97 % | HEIGHT: 72 IN | BODY MASS INDEX: 26.66 KG/M2 | HEART RATE: 51 BPM | SYSTOLIC BLOOD PRESSURE: 128 MMHG | DIASTOLIC BLOOD PRESSURE: 60 MMHG

## 2025-07-31 DIAGNOSIS — I65.23 BILATERAL CAROTID ARTERY STENOSIS: ICD-10-CM

## 2025-07-31 DIAGNOSIS — E78.2 MIXED HYPERLIPIDEMIA: ICD-10-CM

## 2025-07-31 DIAGNOSIS — I77.810 DILATED AORTIC ROOT: ICD-10-CM

## 2025-07-31 DIAGNOSIS — I10 PRIMARY HYPERTENSION: ICD-10-CM

## 2025-07-31 DIAGNOSIS — I25.10 CORONARY ARTERY DISEASE INVOLVING NATIVE CORONARY ARTERY OF NATIVE HEART WITHOUT ANGINA PECTORIS: Primary | ICD-10-CM

## 2025-07-31 PROCEDURE — G8419 CALC BMI OUT NRM PARAM NOF/U: HCPCS | Performed by: SPECIALIST

## 2025-07-31 PROCEDURE — 1160F RVW MEDS BY RX/DR IN RCRD: CPT | Performed by: SPECIALIST

## 2025-07-31 PROCEDURE — 1123F ACP DISCUSS/DSCN MKR DOCD: CPT | Performed by: SPECIALIST

## 2025-07-31 PROCEDURE — 1159F MED LIST DOCD IN RCRD: CPT | Performed by: SPECIALIST

## 2025-07-31 PROCEDURE — G8427 DOCREV CUR MEDS BY ELIG CLIN: HCPCS | Performed by: SPECIALIST

## 2025-07-31 PROCEDURE — 1126F AMNT PAIN NOTED NONE PRSNT: CPT | Performed by: SPECIALIST

## 2025-07-31 PROCEDURE — 99204 OFFICE O/P NEW MOD 45 MIN: CPT | Performed by: SPECIALIST

## 2025-07-31 PROCEDURE — 1036F TOBACCO NON-USER: CPT | Performed by: SPECIALIST

## 2025-07-31 RX ORDER — FUROSEMIDE 20 MG/1
20 TABLET ORAL DAILY
COMMUNITY
Start: 2025-05-05

## 2025-07-31 RX ORDER — MULTIVITAMIN WITH FOLIC ACID 400 MCG
TABLET ORAL
COMMUNITY

## 2025-07-31 RX ORDER — VALSARTAN 80 MG/1
80 TABLET ORAL DAILY
COMMUNITY
Start: 2025-04-25

## 2025-07-31 RX ORDER — VITAMIN B COMPLEX
TABLET ORAL
COMMUNITY
Start: 2024-12-17

## 2025-07-31 RX ORDER — CALCIUM CARBONATE 300MG(750)
TABLET,CHEWABLE ORAL
COMMUNITY

## 2025-07-31 RX ORDER — ROSUVASTATIN CALCIUM 40 MG/1
TABLET, COATED ORAL
COMMUNITY
Start: 2024-11-04

## 2025-07-31 RX ORDER — CLOPIDOGREL BISULFATE 75 MG/1
75 TABLET ORAL DAILY
COMMUNITY
Start: 2024-09-02

## 2025-07-31 ASSESSMENT — PATIENT HEALTH QUESTIONNAIRE - PHQ9
1. LITTLE INTEREST OR PLEASURE IN DOING THINGS: NOT AT ALL
SUM OF ALL RESPONSES TO PHQ QUESTIONS 1-9: 0
2. FEELING DOWN, DEPRESSED OR HOPELESS: NOT AT ALL
SUM OF ALL RESPONSES TO PHQ QUESTIONS 1-9: 0

## 2025-07-31 NOTE — PROGRESS NOTES
HISTORY OF PRESENT ILLNESS  Fran Galindo is a 84 y.o. male     SUMMARY:   Patient Active Problem List   Diagnosis    Mixed hyperlipidemia    Primary hypertension    Coronary artery disease involving native coronary artery of native heart without angina pectoris    Bilateral carotid artery stenosis            CARDIOLOGY STUDIES TO DATE:  [place], [place]:  1/22 CABG x 3 , LIMA> LAD, SVG> OM, SVG> PLB     7/22 cath, closed SVG X 2 and stenosis at LIMA insertion into distal LAD  9/22  PCI to prox LCX and distal OM1   10/22 PCI to mRCA and ostial-prox RPDA  complicated by stroke-like symptoms y    NATASHA stent 10/28/22 and LICA stent 11/01/24 5/24 [facility] echo, normal lvef, grade lll diastolic dysfunction, lae, mild aortic regurg, tr mr and tr, mildly dilated aortic root.     2/25 carotid dopplers, patent bilateral stents, less than 50% left stenosis    Chief Complaint   Patient presents with    New Patient     NEW PATIENT - Referred by PCP, Dr. Pamela Michael [facility] Last cardiologist was in [place] Dr. Reyes, phone # [phone]       Fatigue       HPI :  He is self-referred for cardiac ongoing care.  His cardiac history dates back to 2022 when apparently he had an elevated calcium score when they were living in [place].  His doctor told him to just monitor things and he ended up moving to [place] for a couple years and while he was there his primary care was worried about it and she sent him to a cardiologist and eventually ended up with three-vessel bypass surgery.  He does not recall having had a stress test so not sure how they left to bypass surgery but shortly after he had surgery he was found to have 2 occluded vein grafts and diminutive LIMA to the LAD.  He then went to another hospital in [place] where they did high risk P CI with stenting's to both his RCA, PDA, circumflex and OM.  The second procedure was complicated by a TIA and he then ended up going for bilateral carotid

## 2025-07-31 NOTE — PROGRESS NOTES
1. Have you been to the ER, urgent care clinic since your last visit?  Hospitalized since your last visit?No    2. Have you seen or consulted any other health care providers outside of the [facility] since your last visit?  Include any pap smears or colon screening. No

## 2025-08-22 ENCOUNTER — TELEPHONE (OUTPATIENT)
Age: 85
End: 2025-08-22

## 2025-08-22 DIAGNOSIS — I10 PRIMARY HYPERTENSION: Primary | ICD-10-CM

## 2025-08-22 RX ORDER — VALSARTAN 80 MG/1
80 TABLET ORAL DAILY
Qty: 90 TABLET | Refills: 1 | Status: SHIPPED | OUTPATIENT
Start: 2025-08-22

## (undated) DEVICE — SYR 10ML LUER LOK 1/5ML GRAD --

## (undated) DEVICE — SUTURE MCRYL SZ 3-0 L27IN ABSRB UD L19MM PS-2 3/8 CIR PRIM Y427H

## (undated) DEVICE — Device

## (undated) DEVICE — NEEDLE HYPO 18GA L1.5IN PNK S STL HUB POLYPR SHLD REG BVL

## (undated) DEVICE — SYR IRR BLB 2OZ DISP BLU STRL -- CONVERT TO ITEM 357637

## (undated) DEVICE — MEDI-VAC NON-CONDUCTIVE SUCTION TUBING: Brand: CARDINAL HEALTH

## (undated) DEVICE — TOWEL SURG W17XL27IN STD BLU COT NONFENESTRATED PREWASHED

## (undated) DEVICE — STERILE POLYISOPRENE POWDER-FREE SURGICAL GLOVES: Brand: PROTEXIS

## (undated) DEVICE — SOLUTION SCRB 4OZ 10% PVP I POVIDONE IOD TOP PAINT EXIDINE

## (undated) DEVICE — INFECTION CONTROL KIT SYS

## (undated) DEVICE — KENDALL SCD EXPRESS SLEEVES, KNEE LENGTH, MEDIUM: Brand: KENDALL SCD

## (undated) DEVICE — SOLUTION IV 1000ML 0.9% SOD CHL

## (undated) DEVICE — DEVON™ KNEE AND BODY STRAP 60" X 3" (1.5 M X 7.6 CM): Brand: DEVON

## (undated) DEVICE — HANDLE LT SNAP ON ULT DURABLE LENS FOR TRUMPF ALC DISPOSABLE

## (undated) DEVICE — ELECTRODE NDL TOT L2.13IN EXPOSED L3CM ACT L3MM TIP

## (undated) DEVICE — REM POLYHESIVE ADULT PATIENT RETURN ELECTRODE: Brand: VALLEYLAB

## (undated) DEVICE — PACK,EENT,TURBAN DRAPE,PK II: Brand: MEDLINE

## (undated) DEVICE — FRAZIER SUCTION INSTRUMENT 7 FR W/CONTROL VENT & OBTURATOR: Brand: FRAZIER

## (undated) DEVICE — APPLICATOR PREP 6ML 0.7% IOD POVACRYLEX 74% ISO ALC

## (undated) DEVICE — SUTURE VCRL SZ 5-0 L18IN ABSRB UD L13MM P-3 3/8 CIR PRIM J493G

## (undated) DEVICE — GAUZE SPONGES,12 PLY: Brand: CURITY